# Patient Record
Sex: FEMALE | Race: WHITE
[De-identification: names, ages, dates, MRNs, and addresses within clinical notes are randomized per-mention and may not be internally consistent; named-entity substitution may affect disease eponyms.]

---

## 2017-10-12 NOTE — EMERGENCY ROOM VISIT NOTE
History


Report prepared by Rashid:  Mikey Medeiros


Under the Supervision of:  Dr. Delano Becker D.O.


First contact with patient:  19:15


Chief Complaint:  MEDICATION REFILL REQUEST


Stated Complaint:  ASTHMA ATTACK





History of Present Illness


The patient is a 22 year old female who presents to the Emergency Room with 

complaints of a mild asthma exacerbation that began recently. She has a past 

medical history of asthma. The patient is new to the area and notes that she 

does not have a PCP set up yet. She ran out of her medications and is 

requesting more. She has mild chest tightness which is typical for her asthma, 

but no shortness of breath.  She notes that this is a very mild flare at best.  

She denies any abdominal pain, nausea, vomiting, diarrhea, or fevers. She notes 

that she has a mild cough.





   Source of History:  patient


   Onset:  recently


   Position:  other (Respiratory System)


   Symptom Intensity:  mild


   Quality:  other (Asthma exacerbation)


   Timing:  constant


   Associated Symptoms:  + cough, No fevers, No SOB, No nausea, No vomiting, No 

abdominal pain, No diarrhea





Review of Systems


See HPI for pertinent positives & negatives. A total of 10 systems reviewed and 

were otherwise negative.





Past Medical & Surgical


Medical Problems:


(1) Asthma








Family History





Patient reports no known family medical history.





Social History


Smoking Status:  Current Every Day Smoker


Smokeless Tobacco Use:  No


Alcohol Use:  none


Drug Use:  none


Marital Status:  in relationship





Current/Historical Medications


Scheduled


Albuterol Sulf (Albuterol Sulfate), 3 ML NEB Q6H


Bupropion (Wellbutrin-Xl), 300 MG PO DAILY


Hydroxyzine Pamoate (Vistaril), 25 MG PO TID





Scheduled PRN


Albuterol Hfa (Ventolin Hfa), 2-4 PUFFS INH Q6H PRN for Shortness of Breath


Albuterol Hfa (Ventolin Hfa), 1-2 PUFFS INH Q4H PRN for Shortness of Breath


Albuterol Sulf (Albuterol Sulfate), 3 ML PO Q6 PRN for Shortness of Breath


Beclomethasone Dip (Qvar), 1 PUFF PO DAILY PRN for Shortness of Breath





Allergies


Coded Allergies:  


     Venlafaxine (Unverified  Allergy, Unknown, NAUSEA/FEVER, 10/12/17)





Physical Exam


Vital Signs











  Date Time  Temp Pulse Resp B/P (MAP) Pulse Ox O2 Delivery O2 Flow Rate FiO2


 


10/12/17 20:58  106 20 123/72 94   


 


10/12/17 19:09 36.6 100 16 116/68 98 Room Air  











Physical Exam


GENERAL: Sitting up in bed, alert, well appearing, well nourished, no distress, 

non-toxic, talking in full sentences


EYE EXAM: normal conjunctiva


OROPHARYNX: no exudate, no erythema, lips, buccal mucosa, and tongue normal and 

mucous membranes are moist


NECK: supple, no nuchal rigidity, no adenopathy, non-tender


LUNGS: Wheezing to the bilateral lung fields. Normal chest wall mechanics


HEART: no murmurs, S1 normal and S2 normal 


ABDOMEN: abdomen soft, non-tender, normo-active bowel sounds, no masses, no 

rebound or guarding. 


BACK: Back is symmetrical on inspection and there is no deformity, no midline 

tenderness, no CVA tenderness. 


SKIN: no rashes and no bruising 


UPPER EXTREMITIES: upper extremities are grossly normal. 


LOWER EXTREMITIES: No pitting edema. Calves equal bilaterally. 


NEURO EXAM: Normal sensorium.





Medical Decision & Procedures


ED Course


ED COURSE: 


Vital signs were reviewed and showed tachycardia. 


The patients medical record was reviewed


The above diagnostic studies were performed and reviewed.


ED treatments and interventions as stated above. 





1915: The patient was evaluated in room D6. A complete history and physical 

examination was performed.





2020: Ordered Albuterol 60 puffs INH





2100: Upon reevaluation, the patient is resting. I discussed my findings with 

the patient and she understands and agrees with the treatment plan.   


Based on the patients age, coexisting illnesses, exam and lab findings the 

decision to treat as an outpatient was made.


The patient remained stable while under my care.


The patient appeared well at the time of discharge.





Medical Decision


Differential diagnoses includes but is not limited to pneumonia, bronchitis, 

COPD/Asthma exacerbation, pneumothorax, pulmonary embolism, congestive heart 

failure, acute coronary syndrome.





Patient is a 22-year-old female who presents to ER for medication refill.  

Patient was seen independently of the resident.  Patient was requesting 

prescription for no treatments along with an inhaler which she ran out.  She 

notes this feels like a very mild asthma exacerbation.  She declined that 

treatment.  Patient family wheezing bilaterally.  She felt better.  The 

resident following inhaler treatment.  She was discharged to follow-up with 

PCP. Discussed with Pt concerning signs and symptoms to watch out for. Pt was 

instructed to follow up with their PCP and discussed with the patient their 

option to return to the ED at anytime for persistent or worsening symptoms. The 

appropriate anticipatory guidance and out-patient management, including 

indications for return to the emergency department, were explained at length to 

the patient and understood.





Medication Reconcilliation


Current Medication List:  was personally reviewed by me





Blood Pressure Screening


Patient's blood pressure:  Normal blood pressure


Blood pressure disposition:  Did not require urgent referral





Impression





 Primary Impression:  


 Mild asthma exacerbation





Scribe Attestation


The scribe's documentation has been prepared under my direction and personally 

reviewed by me in its entirety. I confirm that the note above accurately 

reflects all work, treatment, procedures, and medical decision making performed 

by me.





Departure Information


Dispostion


Home / Self-Care





Prescriptions





Albuterol Sulf (Albuterol Sulfate) 2.5 Mg/3 Ml Nebu


3 ML NEB Q6H for 10 Days, #20 VIAL


   Prov: Tip Kruse MD         10/12/17 


Albuterol Hfa (VENTOLIN HFA) 200 Puffs/07868 Mcg Aers


1-2 PUFFS INH Q4H Y for Shortness of Breath for 30 Days, #1 INHALER


   Prov: Tip Kruse MD         10/12/17





Referrals


No Doctor, Assigned (PCP)





Forms


HOME CARE DOCUMENTATION FORM,                                                 

               IMPORTANT VISIT INFORMATION, WORK / SCHOOL INSTRUCTIONS





Patient Instructions


ED Asthma Acute Ch, My [facility]





Additional Instructions





He came to the emergency room because you're having asthma symptoms but did not 

have here usual inhalers and nebulizers.





We provided you with a Ventolin inhaler.  We re-assessed you after taking 2 

puffs and given your excellent response, we felt you could be discharged safely 

back home.





We will prescribe you an additional Ventolin inhaler, and a small supply of 

nebulizers.  Because you're new to the area, you need to establish care with a 

primary care provider who can prescribe and monitor your asthma symptoms.





If your symptoms fail to improve, acutely worsen, please seek medical attention 

immediately by either calling your primary care provider or going to your 

nearest emergency department.





Otherwise, please see your primary care provider within 1 week to ensure that 

your symptoms continue to improve.





It was a pleasure to be involved in your care and we wish you all the best.

## 2017-10-12 NOTE — EMERGENCY ROOM VISIT NOTE
History


First contact with patient:  19:15


Chief Complaint:  MEDICATION REFILL REQUEST


Stated Complaint:  ASTHMA ATTACK





History of Present Illness


The patient is a 22 year old female who presents to the Emergency Room with 

complaints of shortness of breath and chest tightness.





The patient has mild persistent asthma.  Unfortunate the patient does not have 

any more inhalers and nebulizers.  Today she feels some tightness in her chest, 

shortness of breath, wheezing.  She denies chest pain, lightheadedness or 

dizziness, orthopnea or lower extension edema.  She has a dry cough.





She does not have fevers, chills, night sweats.  Her appetite has been good.  

She is not having any difficulty with bowel movements or with urination.





Review of Systems


A 10 point review of systems was negative unless stated above.





Past Medical/Surgical History


Medical Problems:


(1) Asthma








Family History





Patient reports no known family medical history.





Social History


Smoking Status:  Current Every Day Smoker (1 pack per day)


Smokeless Tobacco Use:  No


Alcohol Use:  none


Drug Use:  none


Marital Status:  single


Housing Status:  lives with significant other


Occupation Status:  student





Current/Historical Medications


Scheduled


Albuterol Sulf (Albuterol Sulfate), 3 ML NEB Q6H


Bupropion (Wellbutrin-Xl), 300 MG PO DAILY


Hydroxyzine Pamoate (Vistaril), 25 MG PO TID





Scheduled PRN


Albuterol Hfa (Ventolin Hfa), 2-4 PUFFS INH Q6H PRN for Shortness of Breath


Albuterol Hfa (Ventolin Hfa), 1-2 PUFFS INH Q4H PRN for Shortness of Breath


Albuterol Sulf (Albuterol Sulfate), 3 ML PO Q6 PRN for Shortness of Breath


Beclomethasone Dip (Qvar), 1 PUFF PO DAILY PRN for Shortness of Breath





Allergies


Effexor





Physical Exam


Vital Signs











  Date Time  Temp Pulse Resp B/P (MAP) Pulse Ox O2 Delivery O2 Flow Rate FiO2


 


10/12/17 20:58  106 20 123/72 94   


 


10/12/17 19:09 36.6 100 16 116/68 98 Room Air  








Pain Rating (0-10):  0





Physical Exam


Constitutional:  Vital signs as above were reviewed.  





Eyes: Pupils equal, round, and reactive to light.  Extraocular muscles are 

intact.  No proptosis.  No photophobia.





ENT: Mucous membranes are moist.  Oropharynx is clear.  No sinus tenderness. 





Cardiovascular: Heart with a regular rate and rhythm.  No pedal edema 

appreciated.





Respiratory: And expiratory wheezing in all lung fields


   No evidence of respiratory distress: No nasal flaring, no intercostal 

retraction, no sternocleidal retractions


   Normal oxygen saturation noted





GI: Abdomen soft, nontender, nondistended.  Normal active bowel sounds.  No 

abdominal hernias appreciated.  No rebound.  No guarding.





: No CVA tenderness appreciated.





Musculoskeletal: No midline cervical or vertebral tenderness.  No gross 

deformities.  No bony tenderness.  No calf swelling or tenderness.





Integumentary: Warm, dry, no rashes appreciated.





Neurological: Patient awake, alert, and oriented x 3.





Lymph: No cervical lymphadenopathy appreciated.





Medical Decision & Procedures


ED Course


19:15 - The patient was seen and evaluated by Dr. Tip Kruse MD [facility]





19:30 - Reviewed case with Dr. Delano Becker, ER attending physician


   Will administer 1 albuterol nebulizer treatment  and provide Ventolin inhaler





19:40 - Patient declines nebulizers; will provide 2 puffs Albuterol now and re-

assess





20:35 - Re-assessed patient; wheezing improved; told patient to take 1 more 

puff and I would re-assess





21:00 - Patient reassessed and wheezing improved, patient feeling better


   Discharge home with instructions completed.


   Patient discharged in stable condition.





Medical Decision


The patient is a 22-year-old female with history of mild persistent asthma.





She presents with a mild exacerbation, and did not have her medications to 

treat this at home.





Her initial evaluation in the ER was generally benign with the exception of end 

expiratory wheezing which is consistent with a mild asthma exacerbation in the 

context of her history.   The patient was slightly tachycardic, likely due to 

some mild shortness of breath but was hemodynamic stable and maintained her 

oxygenation throughout her hospital course.  At no point did she have any 

evidence of respiratory distress.





She was treated with 2 puffs of her inhaler, with prompt reassessment 

afterwards upon which I noted that she had made a good improvement in her air 

movement.  For cautionary purposes, I gave her 1 additional puffs and then 

reassessed 15 minutes later and continued to note an improvement.





I felt it was safe for her to go home and continue her recovery with her home 

inhaler as well as a renewal on her nebulizers.  She is new to the area and I 

did recommend to her that she needs a primary care provider to monitor and 

maintain all of her chronic medications.





 Prior to departure she is given instructions on when to come back to the 

emergency department.  The patient had an unremarkable hospital course, and was 

discharged in stable condition.





Head Trauma


GCS Score:  15





Medication Reconcilliation


Current Medication List:  was personally reviewed by me





Blood Pressure Screening


Patient's blood pressure:  Normal blood pressure





Impression





 Primary Impression:  


 Mild asthma exacerbation





Departure Information


Dispostion


Home / Self-Care





Condition


GOOD





Prescriptions





Albuterol Sulf (Albuterol Sulfate) 2.5 Mg/3 Ml Nebu


3 ML NEB Q6H for 10 Days, #20 VIAL


   Prov: Tip Kruse MD         10/12/17 


Albuterol Hfa (VENTOLIN HFA) 200 Puffs/55623 Mcg Aers


1-2 PUFFS INH Q4H Y for Shortness of Breath for 30 Days, #1 INHALER


   Prov: Tip Kruse MD         10/12/17





Referrals


No Doctor, Assigned (PCP)





Patient Instructions


My [facility]





Additional Instructions





He came to the emergency room because you're having asthma symptoms but did not 

have here usual inhalers and nebulizers.





We gave you a nebulizer treatment and provided you with a Ventolin inhaler to 

go home with.





We will prescribe you an additional Ventolin inhaler, and a small supply of 

nebulizers.  Because you're new to the area, you need to establish care with a 

primary care provider who can prescribe and monitor your asthma symptoms.





If your symptoms fail to improve, acutely worsen, please seek medical attention 

immediately by either calling your primary care provider or going to your 

nearest emergency department.





Otherwise, please see your primary care provider within 1 week to ensure that 

your symptoms continue to improve.





It was a pleasure to be involved in your care and we wish you all the best.

## 2018-03-05 NOTE — DIAGNOSTIC IMAGING REPORT
(RENAL)RETROPERITON COMP



HISTORY:  22 years-old Female left flank pain, ? stone acute left-sided flank

pain with pregnancy



COMPARISON: Pelvic ultrasound 1/30/2018



TECHNIQUE: Multiple real-time sonographic images of the kidneys and urinary

bladder were obtained assessing grayscale appearance and color flow



FINDINGS: 

The right kidney measures 10.5 x 5.1 x 6.3 cm and demonstrates mild

hydronephrosis without obstructing calculus or lesion identified. Cortical

medullary differentiation is preserved. The lower pole is somewhat visualized

secondary to obscuring bowel gas.



The left kidney measures 13.0 x 6.8 x 6.3 cm and also demonstrates mild

hydronephrosis with multiple shadowing calculi seen about the left kidney,

largest of which measures 6 mm within the inferior pole. There is a complex

cystic lesion within internal septations involving the interpolar left kidney

measuring up to 1.1 x 1.1 x 1.6 cm.



Mobile echogenic debris is noted throughout the urinary bladder lumen. Bilateral

ureteral jets are seen.



Intrauterine gestation is partially imaged with fetal head causing mass effect

upon the urinary bladder. The patient is reportedly 22 weeks pregnant.



IMPRESSION: 

1. Intrauterine gestation partially imaged with fetal head causing mass effect

upon the urinary bladder.

2. Urinary bladder is diffusely filled with echogenic mobile debris. Correlate

with urinalysis to exclude cystitis.

3. Mild bilateral hydronephrosis with multiple nonobstructing calculi seen

within the left kidney measuring up to 6 mm.







The above report was generated using voice recognition software. It may contain

grammatical, syntax or spelling errors.







Electronically signed by:  Héctor Ceron M.D.

3/5/2018 6:49 AM



Dictated Date/Time:  3/5/2018 6:45 AM

## 2018-03-05 NOTE — EMERGENCY ROOM VISIT NOTE
History


First contact with patient:  03:56


Chief Complaint:  FLANK PAIN


Stated Complaint:  SEVERE LFT SIDE PAIN,VOMITING,23WKS PREGNANT L&D C





History of Present Illness


The patient is a 22 year old female who presents to the Emergency Room with 

complaints of severe sudden onset of left flank pain that radiates to her groin 

for the past 2 hours that woke her up out of sleep ranging in severity 

currently 7 out of 10.  Nothing makes it better or worse.  She took Advil with 

some improvement of symptoms.  She is 23 weeks pregnant.  She sees [facility]onie [facility].  This is her first pregnancy.  No comp occasions of the pregnancy.  

Patient does smoke.  No alcohol or drug use.  Patient denies chest pain, dyspnea

, fever, chills, vomiting, diarrhea, pregnancy problems, vaginal discharge or 

bleeding.  Patient states she can feel the baby move.





Review of Systems


An 10 system review of systems was completed with positives and pertinent 

negatives listed in the HPI.





Past Medical/Surgical History


Medical Problems:


(1) Asthma


Kidney stones





Family History





Patient reports no known family medical history.





Social History


Smoking Status:  Never Smoker


Alcohol Use:  none


Drug Use:  none


Marital Status:  in relationship


Housing Status:  lives with significant other





Current/Historical Medications


Scheduled


Diphenhydramine Hcl (Benadryl), 25 MG PO DAILY





Scheduled PRN


Albuterol Hfa (Ventolin Hfa), 2-4 PUFFS INH Q6H PRN for Shortness of Breath


Albuterol Sulf (Albuterol Sulfate), 3 ML PO Q6 PRN for Shortness of Breath





Physical Exam


Vital Signs











  Date Time  Temp Pulse Resp B/P (MAP) Pulse Ox O2 Delivery O2 Flow Rate FiO2


 


3/5/18 05:46  106 24 128/80 100 Room Air  


 


3/5/18 03:50 36.3 106 20 120/78 97 Room Air  











Physical Exam


VITALS: Vitals are noted on the nurse's note and reviewed by myself.  Vital 

signs stable.


GENERAL: Pleasant female, in no acute distress, nondiaphoretic, well-developed 

well-nourished.


SKIN: Capillary reflex less than 2 seconds.


HEENT: Normocephalic.  PERRLA.  EOMI.  Nares patent.  Mucous membranes moist.  

Neck is supple without nuchal rigidity.


HEART: Regular rate and rhythm without murmurs gallops or rubs.


LUNGS: Clear to auscultation bilaterally without wheezes, rales or rhonchi.  No 

retractions or accessory muscle use.


ABDOMEN: Positive bowel sounds x 4.  Normal tympanic percussion.  Soft, 23 

weeks pregnant, no CVA tenderness, nontender, without masses or organomegaly. 

Saul sign negative.  No guarding or rebound tenderness.


MUSCULOSKELETAL: No gross musculoskeletal defects.  No pedal edema.  No calf 

tenderness.


NEURO: Patient was alert and oriented to person place and time.  Normal 

sensation to light and sharp touch.  No focal neurological deficits.





Medical Decision & Procedures


Laboratory Results


3/5/18 04:20








Red Blood Count 3.47, Mean Corpuscular Volume 88.2, Mean Corpuscular Hemoglobin 

31.7, Mean Corpuscular Hemoglobin Concent 35.9, Mean Platelet Volume 8.7, 

Neutrophils (%) (Auto) 82.2, Lymphocytes (%) (Auto) 12.7, Monocytes (%) (Auto) 

3.2, Eosinophils (%) (Auto) 1.2, Basophils (%) (Auto) 0.1, Neutrophils # (Auto) 

9.94, Lymphocytes # (Auto) 1.54, Monocytes # (Auto) 0.39, Eosinophils # (Auto) 

0.15, Basophils # (Auto) 0.01





3/5/18 04:20

















Test


  3/5/18


04:20 3/5/18


06:00


 


White Blood Count


  12.10 K/uL


(4.8-10.8) 


 


 


Red Blood Count


  3.47 M/uL


(4.2-5.4) 


 


 


Hemoglobin


  11.0 g/dL


(12.0-16.0) 


 


 


Hematocrit 30.6 % (37-47)  


 


Mean Corpuscular Volume


  88.2 fL


() 


 


 


Mean Corpuscular Hemoglobin


  31.7 pg


(25-34) 


 


 


Mean Corpuscular Hemoglobin


Concent 35.9 g/dl


(32-36) 


 


 


Platelet Count


  219 K/uL


(130-400) 


 


 


Mean Platelet Volume


  8.7 fL


(7.4-10.4) 


 


 


Neutrophils (%) (Auto) 82.2 %  


 


Lymphocytes (%) (Auto) 12.7 %  


 


Monocytes (%) (Auto) 3.2 %  


 


Eosinophils (%) (Auto) 1.2 %  


 


Basophils (%) (Auto) 0.1 %  


 


Neutrophils # (Auto)


  9.94 K/uL


(1.4-6.5) 


 


 


Lymphocytes # (Auto)


  1.54 K/uL


(1.2-3.4) 


 


 


Monocytes # (Auto)


  0.39 K/uL


(0.11-0.59) 


 


 


Eosinophils # (Auto)


  0.15 K/uL


(0-0.5) 


 


 


Basophils # (Auto)


  0.01 K/uL


(0-0.2) 


 


 


RDW Standard Deviation


  41.9 fL


(36.4-46.3) 


 


 


RDW Coefficient of Variation


  13.1 %


(11.5-14.5) 


 


 


Immature Granulocyte % (Auto) 0.6 %  


 


Immature Granulocyte # (Auto)


  0.07 K/uL


(0.00-0.02) 


 


 


Anion Gap


  7.0 mmol/L


(3-11) 


 


 


Est Creatinine Clear Calc


Drug Dose 136.5 ml/min 


  


 


 


Estimated GFR (


American) 148.3 


  


 


 


Estimated GFR (Non-


American 128.0 


  


 


 


BUN/Creatinine Ratio 16.0 (10-20)  


 


Calcium Level


  8.8 mg/dl


(8.5-10.1) 


 


 


Total Bilirubin


  0.3 mg/dl


(0.2-1) 


 


 


Direct Bilirubin


  < 0.1 mg/dl


(0-0.2) 


 


 


Aspartate Amino Transf


(AST/SGOT) 9 U/L (15-37) 


  


 


 


Alanine Aminotransferase


(ALT/SGPT) 17 U/L (12-78) 


  


 


 


Alkaline Phosphatase


  65 U/L


() 


 


 


Total Protein


  6.6 gm/dl


(6.4-8.2) 


 


 


Albumin


  3.2 gm/dl


(3.4-5.0) 


 


 


Urine Color  YELLOW 


 


Urine Appearance  CLEAR (CLEAR) 


 


Urine pH  7.5 (4.5-7.5) 


 


Urine Specific Gravity


  


  1.006


(1.000-1.030)


 


Urine Protein  NEG (NEG) 


 


Urine Glucose (UA)  NEG (NEG) 


 


Urine Ketones  NEG (NEG) 


 


Urine Occult Blood  1+ (NEG) 


 


Urine Nitrite  NEG (NEG) 


 


Urine Bilirubin  NEG (NEG) 


 


Urine Urobilinogen  NEG (NEG) 


 


Urine Leukocyte Esterase  NEG (NEG) 


 


Urine WBC (Auto)  1-5 /hpf (0-5) 


 


Urine RBC (Auto)  0-4 /hpf (0-4) 


 


Urine Hyaline Casts (Auto)  1-5 /lpf (0-5) 


 


Urine Epithelial Cells (Auto)  >30 /lpf (0-5) 


 


Urine Bacteria (Auto)  NEG (NEG) 











Medications Administered











 Medications


  (Trade)  Dose


 Ordered  Sig/Darrion


 Route  Start Time


 Stop Time Status Last Admin


Dose Admin


 


 Morphine Sulfate


  (MoRPHine


 SULFATE INJ)  4 mg  NOW  STAT


 IV  3/5/18 04:04


 3/5/18 04:06 DC 3/5/18 04:34


4 MG


 


 Ondansetron HCl


  (Zofran Inj)  4 mg  NOW  STAT


 IV  3/5/18 04:04


 3/5/18 04:06 DC 3/5/18 04:33


4 MG


 


 Sodium Chloride  1,000 ml @ 


 999 mls/hr  Q1H1M STAT


 IV  3/5/18 04:04


 3/5/18 05:04 DC 3/5/18 04:33


999 MLS/HR


 


 Morphine Sulfate


  (MoRPHine


 SULFATE INJ)  4 mg  NOW  STAT


 IV  3/5/18 05:44


 3/5/18 05:45 DC 3/5/18 05:50


4 MG


 


 Ondansetron HCl


  (Zofran Inj)  4 mg  NOW  STAT


 IV  3/5/18 05:44


 3/5/18 05:45 DC 3/5/18 05:50


4 MG











ED Course


Prior records/ancillary studies reviewed.


Triage Nursing notes reviewed.


Additional history obtained from the family.





The patient's history was concerning for left flank pain who is currently 23 

weeks pregnant





Differential diagnosis:


Etiologies such as pregnancy complication, renal colic, appendicitis, 

diverticulitis, mesenteric ischemia, aortic pathology, infections, inflammatory 

bowel disease, PUD, biliary pathology, UTI, as well as others were entertained.





Physical examination findings:


As above.





ER treatment provided:


Morphine, Zofran, IV fluids


On reassessment the patient felt better.





Diagnostic interpretation by me:


The labs revealed mild leukocytosis. Urinalysis revealed no concerns for 

infection


First urinalysis appeared contaminated so a cath urine was done





Imaging studies:


US RENAL:


Additional history, 22 WKS PREG PER PT.


Mild bilateral hydronephrosis.


Multiple left renal calculi. Largest 6 mm in the left lower pole.


Complex left renal cyst, 1.6 cm.


No obvious distal ureteral calculus visualized. Normal ureteral jets visualized 

bilaterally.


Mobile debris within the urinary bladder. Correlate for infection.


Radiologist: Addy Almodovar M.D.





Consultation:


A consultation was placed with the OB, Dr Gresham. The case was discussed and 

diagnostics were reviewed.  He recommends discharge with outpatient follow-up 

and states no need for fetal monitoring.





It appears that the patient has left flank pain with unclear etiology.  Patient 

had no real blood on cath urinalysis.  Ultrasound showed no obvious signs of 

obstructing stone.  Patient had no complaints with her pregnancy.  Patient had 

normal fetal movement on limited ultrasound in the ER per my interpretation.  

Patient had normal fetal heart tones.  Patient had no vaginal bleeding or 

concerns with her pregnancy.  Patient was advised to take the medicines as 

directed, rest, stay well-hydrated to follow-up with OB in a day or 2 here in 

the ER sooner for abdominal pain, fevers, vomiting, worsening signs or symptoms 

or as needed.





By the evaluation outlined above emergent etiologies such as appendicitis, 

diverticulitis, mesenteric ischemia, aortic pathology, inflammatory bowel 

disease, PUD, biliary pathology, as well as others were deemed relatively 

unlikely.





The pt informed about the findings as listed above. All questions were answered 

and  pleased with the treatment. Return instructions were outlined and the 

patient was discharged in stable condition.











Referral:


The patient was referred back to their OB/GYN and primary care physician for 

follow-up in 2 to 3 days for a recheck of the current condition.





Case reviewed with my attending





The chart was completed utilizing Dragon Speech voice recognition software.   

Grammatical errors, random word insertions, pronoun errors, and incomplete 

sentences are an occassional consequence of this system due to software 

limitations, ambient noise, and hardware issues.  Any formal questions or 

concerns about the content, text, or information contained within the body of 

this dictation should be directly addressed to the physician assistant for 

clarification.





Medical Decision


As above





Medication Reconcilliation


Current Medication List:  was personally reviewed by me





Blood Pressure Screening


Patient's blood pressure:  Normal blood pressure





Impression





 Primary Impression:  


 Left flank pain





Departure Information


Dispostion


Home / Self-Care





Condition


GOOD





Referrals


No Doctor, Assigned (PCP)





Patient Instructions


My [facility]





Additional Instructions





DO NOT drive, drink alcohol, operate machinery, or perform dangerous activities 

today.  You were given medications in the ER that can affect your ability to 

safely function or operate a vehicle.


Zofran 4 mg:  Take one every six hours as needed for nausea. Avoid alcohol, 

operating machinery or dangerous equipment, working on ladders or roofs, DRIVING

, or situations where being under the influence may be dangerous.





Acetaminophen(Tylenol) may be used for fever or pain.  Use 1000mg every six 

hours as needed.  Avoid using more than 3000mg in a 24 hour period.  





Rest and drink plenty of fluids as tolerated.  Slow sips of water or sports 

drinks are recommended instead of large amounts all at once.  





Continue current medications.





Once your stomach is settled start with a clear liquid diet (jello, soup broth, 

etc.) and then advance as tolerated.  You should avoid full, heavy meals for 

about 24 hrs from the time your symptoms resolved.  





Return to the ER immediately for worsening or persistent abdominal/back pain, 

problems with your pregnancy, vomiting, fevers, worsening of your condition, or 

as needed.





Follow up with your primary physician and/or OB/GYN within 1-2 days for a 

recheck of the current condition.

## 2018-03-24 NOTE — DIAGNOSTIC IMAGING REPORT
(RENAL)RETROPERITON COMP



HISTORY:     eval for hydro



COMPARISON:  3/5/2018



FINDINGS:



Right kidney: Maximum dimension 12.4 cm. Mild hydronephrosis. No change in the

prior study. Normal corticomedullary differentiation and cortical thickness.



Left kidney:  Moderate hydronephrosis. Maximum dimension 15 cm. 1.9 cm septated

cyst upper pole. 5 mm nonobstructing mid pole calcification. Normal

corticomedullary differentiation and cortical thickness.



Bladder: No bladder wall thickening. The bilateral ureteral jets were

identified.



IMPRESSION:  



1. Moderate left hydronephrosis.





2. Mild right hydronephrosis.

3. Nonobstructing calcification mid pole left kidney with an unchanging

interpolar cyst.

4. No change from the prior study. 









The above report was generated using voice recognition software.  It may contain

grammatical, syntax or spelling errors.







Electronically signed by:  Jules Lindsay M.D.

3/24/2018 9:58 PM



Dictated Date/Time:  3/24/2018 9:56 PM

## 2018-03-25 NOTE — EMERGENCY ROOM VISIT NOTE
History


Report prepared by Rashid:  Jesus Melendez


Under the Supervision of:  Dr. Candido Newsome M.D.


First contact with patient:  20:56


Chief Complaint:  KIDNEY STONE


Stated Complaint:  BURNING W/URINATION,BACK PAIN- HX KIDNEY STONE





History of Present Illness


The patient is a 22 year old female who is 26-weeks pregnant who presents to 

the Emergency Room with complaints of worsening left flank pain that started 

almost 3 weeks ago. She states that she was here on the 5th of this month for 

the same left flank pain. The patient says that she then went to [facility] in 

[place] around 2 weeks ago and had a urology consult, and had an ultrasound 

which showed stones in both kidneys. The patient states that she was not 

admitted at [place]. She states that she was given Flomax, and was put on 

Hydrocodone and amoxicillin 800 mg. The patient says that she did not start 

taking the antibiotics until yesterday when she developed urinary symptoms. She 

adds that she was told that she had a lot of bacteria in her urine, but she did 

not feel any symptoms of a UTI at that time. The patient says that her left 

flank pain calmed down a bit ever since being in [place], but starting this 

morning the pain came back and has worsened. She describes the pain as a 

throbbing and aching. The patient adds that starting yesterday, she has been 

experiencing an increased urgency and frequency of urination, as well as 

burning with urination. She notes that she had been vomiting when she was seen 

here earlier this month, but started vomiting a lot last week. The patient says 

that she has not felt feverish. She denies any vaginal bleeding or problems 

with her baby. She does feel the baby move.





   Source of History:  patient


   Onset:  Almost 3 weeks ago


   Position:  other (left flank)


   Quality:  ache, other (throbbing)


   Timing:  worsening


   Associated Symptoms:  + vomiting, + urinary symptoms, No fevers


Note:


Associated symptoms: Denies vaginal discharge, problems with baby.





Review of Systems


See HPI for pertinent positives & negatives. A total of 10 systems reviewed and 

were otherwise negative.





Past Medical & Surgical


Medical Problems:


(1) Asthma


(2) Kidney stone complicating pregnancy








Family History





Patient reports no known family medical history.





Social History


Smoking Status:  Current Every Day Smoker


Alcohol Use:  none


Drug Use:  none


Marital Status:  in relationship


Housing Status:  lives with significant other





Current/Historical Medications


Scheduled


Cephalexin Monohydrate (Keflex), 500 MG PO QID


Diphenhydramine Hcl (Benadryl), 25 MG PO DAILY


Fluticasone Furoate-Vilanterol (Breo Ellipta), 1 PUFF INH DAILY


Tamsulosin Hcl (Flomax), 1 CAP PO DAILY





Scheduled PRN


Albuterol Hfa (Ventolin Hfa), 2-4 PUFFS INH Q6H PRN for Shortness of Breath


Albuterol Sulf (Albuterol Sulfate), 3 ML PO Q6 PRN for Shortness of Breath





Allergies


Coded Allergies:  


     Venlafaxine (Verified  Allergy, Intermediate, NAUSEA/FEVER, 3/24/18)





Physical Exam


Vital Signs











  Date Time  Temp Pulse Resp B/P (MAP) Pulse Ox O2 Delivery O2 Flow Rate FiO2


 


3/24/18 22:24  115  132/75 98 Room Air  


 


3/24/18 20:52 36.7 134 20 123/73 99 Room Air  











Physical Exam





Constitutional: Vital signs reviewed.


Eyes: Pupils are equal round reactive to light.  Conjunctiva are noninjected.  


ENT: Pharynx is clear without erythema or exudate.  Mucous membranes are dry.  

Neck supple without meningeal signs.


Respiratory: Clear to auscultation bilaterally.  Breath sounds are equal 

bilaterally. 


Cardiovascular: Regular rate and rhythm.  No rubs or gallops.


GI: Gravid and nontender.  Bowel sounds are present.


Musculoskeletal: No peripheral edema.  No CVA tenderness.


Integumentary: No cyanosis.


Neurological: The patient is awake and alert.  No focal deficits.


Psychiatric: Normal affect.





Medical Decision & Procedures


ER Provider


Diagnostic Interpretation:


US results as stated below per my review and radiologist interpretation. 








(RENAL)RETROPERITON COMP





HISTORY:     eval for hydro





COMPARISON:  3/5/2018





FINDINGS:





Right kidney: Maximum dimension 12.4 cm. Mild hydronephrosis. No change in the


prior study. Normal corticomedullary differentiation and cortical thickness.





Left kidney:  Moderate hydronephrosis. Maximum dimension 15 cm. 1.9 cm septated


cyst upper pole. 5 mm nonobstructing mid pole calcification. Normal


corticomedullary differentiation and cortical thickness.





Bladder: No bladder wall thickening. The bilateral ureteral jets were


identified.





IMPRESSION:  





1. Moderate left hydronephrosis.








2. Mild right hydronephrosis.


3. Nonobstructing calcification mid pole left kidney with an unchanging


interpolar cyst.


4. No change from the prior study.





Laboratory Results


3/24/18 21:20








Red Blood Count 3.34, Mean Corpuscular Volume 89.2, Mean Corpuscular Hemoglobin 

31.4, Mean Corpuscular Hemoglobin Concent 35.2, Mean Platelet Volume 8.6, 

Neutrophils (%) (Auto) 79.3, Lymphocytes (%) (Auto) 12.1, Monocytes (%) (Auto) 

6.0, Eosinophils (%) (Auto) 2.3, Basophils (%) (Auto) 0.1, Neutrophils # (Auto) 

9.81, Lymphocytes # (Auto) 1.49, Monocytes # (Auto) 0.74, Eosinophils # (Auto) 

0.28, Basophils # (Auto) 0.01





3/24/18 21:20

















Test


  3/24/18


21:20


 


White Blood Count


  12.35 K/uL


(4.8-10.8)


 


Red Blood Count


  3.34 M/uL


(4.2-5.4)


 


Hemoglobin


  10.5 g/dL


(12.0-16.0)


 


Hematocrit 29.8 % (37-47) 


 


Mean Corpuscular Volume


  89.2 fL


()


 


Mean Corpuscular Hemoglobin


  31.4 pg


(25-34)


 


Mean Corpuscular Hemoglobin


Concent 35.2 g/dl


(32-36)


 


Platelet Count


  257 K/uL


(130-400)


 


Mean Platelet Volume


  8.6 fL


(7.4-10.4)


 


Neutrophils (%) (Auto) 79.3 % 


 


Lymphocytes (%) (Auto) 12.1 % 


 


Monocytes (%) (Auto) 6.0 % 


 


Eosinophils (%) (Auto) 2.3 % 


 


Basophils (%) (Auto) 0.1 % 


 


Neutrophils # (Auto)


  9.81 K/uL


(1.4-6.5)


 


Lymphocytes # (Auto)


  1.49 K/uL


(1.2-3.4)


 


Monocytes # (Auto)


  0.74 K/uL


(0.11-0.59)


 


Eosinophils # (Auto)


  0.28 K/uL


(0-0.5)


 


Basophils # (Auto)


  0.01 K/uL


(0-0.2)


 


RDW Standard Deviation


  41.6 fL


(36.4-46.3)


 


RDW Coefficient of Variation


  12.9 %


(11.5-14.5)


 


Immature Granulocyte % (Auto) 0.2 % 


 


Immature Granulocyte # (Auto)


  0.02 K/uL


(0.00-0.02)


 


Urine Color YELLOW 


 


Urine Appearance TURBID (CLEAR) 


 


Urine pH 8.0 (4.5-7.5) 


 


Urine Specific Gravity


  1.020


(1.000-1.030)


 


Urine Protein NEG (NEG) 


 


Urine Glucose (UA) NEG (NEG) 


 


Urine Ketones NEG (NEG) 


 


Urine Occult Blood NEG (NEG) 


 


Urine Nitrite NEG (NEG) 


 


Urine Bilirubin NEG (NEG) 


 


Urine Urobilinogen NEG (NEG) 


 


Urine Leukocyte Esterase SMALL (NEG) 


 


Urine WBC (Auto) >30 /hpf (0-5) 


 


Urine RBC (Auto) 0-4 /hpf (0-4) 


 


Urine Hyaline Casts (Auto)


  5-10 /lpf


(0-5)


 


Urine Epithelial Cells (Auto) >30 /lpf (0-5) 


 


Urine Bacteria (Auto) NEG (NEG) 


 


Urine Renal Epithelial Cells 0-5 /lpf (0-5) 


 


Urine Crystals


  CALCIUM


OXALATE (NONE


 


Anion Gap


  6.0 mmol/L


(3-11)


 


Est Creatinine Clear Calc


Drug Dose 99.4 ml/min 


 


 


Estimated GFR (


American) 111.1 


 


 


Estimated GFR (Non-


American 95.9 


 


 


BUN/Creatinine Ratio 14.3 (10-20) 


 


Calcium Level


  8.6 mg/dl


(8.5-10.1)


 


Total Bilirubin


  0.2 mg/dl


(0.2-1)


 


Direct Bilirubin


  < 0.1 mg/dl


(0-0.2)


 


Aspartate Amino Transf


(AST/SGOT) 12 U/L (15-37) 


 


 


Alanine Aminotransferase


(ALT/SGPT) 23 U/L (12-78) 


 


 


Alkaline Phosphatase


  82 U/L


()


 


Total Protein


  6.8 gm/dl


(6.4-8.2)


 


Albumin


  3.1 gm/dl


(3.4-5.0)


 


Lipase


  114 U/L


()





Laboratory results as reviewed by me.





Medications Administered











 Medications


  (Trade)  Dose


 Ordered  Sig/Darrion


 Route  Start Time


 Stop Time Status Last Admin


Dose Admin


 


 Sodium Chloride  1,000 ml @ 


 999 mls/hr  Q1H1M STAT


 IV  3/24/18 21:04


 3/24/18 22:04 DC 3/24/18 21:22


999 MLS/HR


 


 Morphine Sulfate


  (MoRPHine


 SULFATE INJ)  4 mg  NOW  STAT


 IV  3/24/18 22:05


 3/24/18 22:06 DC 3/24/18 22:10


4 MG


 


 Ceftriaxone Sodium


  (Rocephin Inj)  1 gm  NOW  STAT


 IV  3/24/18 23:03


 3/24/18 23:04 DC 3/24/18 23:09


1 GM


 


 Cephalexin


 Monohydrate


  (Keflex 500MG


 Home Pack)  1 homepack  NOW  ONCE


 PO  3/25/18 00:15


 3/25/18 00:16 DC 3/25/18 00:11


1 HOMEPACK











ED Course


2057: The patient was evaluated in room C8. A complete history and physical 

exam was performed.





2104: NSS 1000 ml @ 999 mls/hr IV.





2204: The patient is requesting pain medicine.





2205: Morphine Sulfate Inj 4 mg IV.





2255: I reevaluated the patient and she is feeling better now.





2300: I discussed the patient with Dr. Murray [facility] - he said he 

agrees with admission here and conservative management with IV antibiotics and 

fluids.





2303: Rocephin Inj 1 gm IV.





2304: I reevaluated the patient and she is resting. I discussed the results 

with her.





2311: I spoke with Dr. Frankel - [facility] resident internist. We discussed the 

patient and her results. The patient will be further evaluated by Misty Hernández.





2334: I discussed the patient with Dr. Rama Dwyer [facility] OBGYN - she will evaluate 

the patient for further treatment.





2345: I reevaluated the patient and she is having a panic attack. She has 

concern about staying in the hospital by herself, so I had a long discussion 

with her and her boyfriend, and discussed the risks of leaving against medical 

advice. Her boyfriend is trying to convince her to stay.





2351: Benadryl Inj 25 mg IV.





2357: I reevaluated the patient and she will leave against medical advice. She 

understands the risks to herself as well as the baby. Dr. Ontiveros recommended 

Keflex as an outpatient antibiotic. She looked at some data showing equivalence 

with Rocephin in terms of treatment.





0001: Cephalexin 500MG Home Pack 1 homepack PO.





Medical Decision


This is a 22-year-old female who presents with flank pain and urinary symptoms.

  Differential diagnosis includes obstructive uropathy, renal colic, UTI, 

pyelonephritis, dehydration.  I did perform a limited focused review of 

portions of the patient's old chart on the electronic medical record. The 

patient came in with left flank pain on the 5th, and was 23 weeks pregnant at 

the time. She had an ultrasound which showed bilateral mild hydronephrosis and 

a left renal calculus. No ureteral calculi were noted. The urine culture from 

March 5th did not show any specific organisms.





I did evaluate the patient as noted above.  IV access was established.  I did 

treat the patient with normal saline IV.  Initially she did not wish to have 

any pain medication but later requested some and was given morphine IV.  I did 

order and personally review the patient's urinalysis as described above.  She 

does have evidence of a UTI.  She was given ceftriaxone 1 g IV.  I also sent a 

urine culture.  I did order and review the patient's blood work as noted in the 

electronic medical record.  Her white blood cell count is elevated.  Kidney 

function is unremarkable.  I did order an ultrasound of the kidneys.  I did 

review the images myself as well as the radiology report as described above.  

She does have moderate hydronephrosis of the left kidney compared to mild from 

her last ultrasound on the fifth.  I did discuss the test results with the 

patient.  I did recommend hospitalization due to her UTI with an obstructing 

kidney stone with hydronephrosis.  She did agree to the hospitalization.  I did 

speak to Dr. Gao of urology who recommended pain management, fluids and IV 

antibiotics.  He did not feel any surgical intervention was needed currently.  

I did speak to the medical service who requested that the patient be admitted 

by the obstetric service.  I did speak to Dr. Ontiveros who did agree to admit the 

patient.  The nurse called me back into the room because the patient was 

refusing to stay in the hospital.  I did have a long discussion with several re-

evaluations with the patient and her boyfriend.  The patient was very upset and 

was not willing to stay in the hospital.  She explained that it was the 

isolation of being in the hospital that upset her.  I did talk to her about the 

risks of leaving including worsening infection, sepsis, death, permanent 

disability, loss of her fetus or birth defects.  She and her boyfriend 

understood my concerns but she was still unwilling to stay.  She was very 

anxious.  I did offer anxiety medications but she still stated that she could 

not stay in the hospital.  I did speak to Dr. Ontiveros who recommended that 

patient be discharged on Keflex.  I made every effort to convince the patient 

to stay but she was adamant about leaving.  The patient was signed out AGAINST 

MEDICAL ADVICE.  I did remind her and her boyfriend that should she change her 

mind at any time she should not hesitate to come back.





Medication Reconcilliation


Current Medication List:  was personally reviewed by me





Blood Pressure Screening


Patient's blood pressure:  Normal blood pressure





Consults


Time Called:  2255


Consulting Physician:  Dr. Murray RIOS urology


Returned Call:  2300


I discussed the patient with Dr. Murray RIOS urology - he said he agrees 

with admission here and conservative management with IV antibiotics and fluids.


Additional Consults:  


   Time Called:  2307


   Consulted Physician:  Dr. Frankel - [facility] resident internist


   Returned Call:  2311


Additional Comments:


I spoke with Dr. Frankel - [facility] resident internist. We discussed the patient 

and her results. The patient will be further evaluated by [facility].


   Time Called:  2330


   Consulted Physician:  Dr. Rama RIOS OBGYMARIAM


   Returned Call:  2330


Additional Comments:


I discussed the patient with Dr. Rama DEGROOT - she will evaluate the 

patient for further treatment.





Impression





 Primary Impression:  


 Obstructive uropathy


 Additional Impressions:  


 Renal colic


 UTI (urinary tract infection)


 Second trimester pregnancy


 Left against medical advice





Scribe Attestation


The scribe's documentation has been prepared under my direct and personally 

reviewed by me in its entirety. I confirm that the note above accurately 

reflects all work, treatment, procedures, and medical decision making performed 

by me.





Departure Information


Dispostion


Against Medical Advice





Prescriptions





Cephalexin Monohydrate (Keflex) 500 Mg Cap


500 MG PO QID for 10 Days, #40 CAP


   Prov: Candido Newsome M.D.         3/25/18





Referrals


No Doctor, Assigned (PCP)





Patient Instructions


My [facility]





Additional Instructions





You are leaving the hospital AGAINST MEDICAL ADVICE. By doing so you risk death 

or permanent mental or physical handicap or loss of your unborn baby or birth 

defects. You may also develop chronic pain, infertility (loss of the ability to 

have children permanently ), or other unforeseen negative consequences. You are 

declining hospitalization. Follow up as soon as possible with your physician. 

You may return at any time if you change your mind or if you develop worsening 

symptoms or any new concerning symptoms.





Problem Qualifiers








 Additional Impressions:  


 UTI (urinary tract infection)


 Urinary tract infection type:  site unspecified  Hematuria presence:  without 

hematuria  Qualified Codes:  N39.0 - Urinary tract infection, site not specified

## 2018-05-22 ENCOUNTER — HOSPITAL ENCOUNTER (OUTPATIENT)
Dept: HOSPITAL 45 - C.LAB1850 | Age: 23
Discharge: HOME | End: 2018-05-22
Attending: OBSTETRICS & GYNECOLOGY
Payer: COMMERCIAL

## 2018-05-22 DIAGNOSIS — Z34.03: Primary | ICD-10-CM

## 2018-05-22 LAB
HCT VFR BLD CALC: 28.9 % (ref 37–47)
HGB BLD-MCNC: 10.1 G/DL (ref 12–16)